# Patient Record
Sex: FEMALE | Race: WHITE | NOT HISPANIC OR LATINO | URBAN - METROPOLITAN AREA
[De-identification: names, ages, dates, MRNs, and addresses within clinical notes are randomized per-mention and may not be internally consistent; named-entity substitution may affect disease eponyms.]

---

## 2020-07-25 ENCOUNTER — TELEPHONE ENCOUNTER (OUTPATIENT)
Dept: URBAN - METROPOLITAN AREA CLINIC 13 | Facility: CLINIC | Age: 50
End: 2020-07-25

## 2020-07-25 RX ORDER — PREDNISONE 10 MG/1
TAKE 1 TABLET BY MOUTH THREE TIMES DAILY TABLET ORAL
Qty: 90 | Refills: 0 | OUTPATIENT
Start: 2017-09-06 | End: 2018-09-05

## 2020-07-25 RX ORDER — POLYETHYLENE GLYCOL 3350, SODIUM SULFATE, SODIUM CHLORIDE, POTASSIUM CHLORIDE, ASCORBIC ACID, SODIUM ASCORBATE 7.5-2.691G
TAKE 32 OZ AS DIRECTED DRINK ONE BTL STARTING @ 6P AND THE SECOND BTL 5 HR PRIOR TO PROCEDURE KIT ORAL
Qty: 1 | Refills: 0 | OUTPATIENT
Start: 2014-03-12 | End: 2014-11-12

## 2020-07-25 RX ORDER — POLYETHYLENE GLYCOL 3350, SODIUM SULFATE, SODIUM CHLORIDE, POTASSIUM CHLORIDE, ASCORBIC ACID, SODIUM ASCORBATE 7.5-2.691G
TAKE 32 OZ AS DIRECTED DRINK ONE BTL STARTING @ 6P AND THE SECOND BTL 5 HR PRIOR TO PROCEDURE KIT ORAL
Qty: 1 | Refills: 0 | OUTPATIENT
Start: 2014-03-12 | End: 2014-03-12

## 2020-07-25 RX ORDER — CALCIUM CITRATE/VITAMIN D3 315MG-6.25
TAKE 1 TABLET TWICE DAILY PT UNSURE OF DOSAGE TABLET ORAL
Refills: 0 | OUTPATIENT
Start: 2008-06-03 | End: 2014-11-12

## 2020-07-25 RX ORDER — BUDESONIDE 9 MG/1
TAKE 1 9 MG TABLET BY MOUTH EVERY MORNING TABLET, EXTENDED RELEASE ORAL
Qty: 30 | Refills: 1 | OUTPATIENT
Start: 2017-08-28 | End: 2017-10-19

## 2020-07-25 RX ORDER — IBANDRONATE SODIUM 150 MG
TAKE AS DIRECTED TABLET ORAL
Refills: 0 | OUTPATIENT
Start: 2008-06-03 | End: 2011-12-06

## 2020-07-25 RX ORDER — PREDNISONE 20 MG/1
TAKE 1 TABLET DAILY TABLET ORAL
Qty: 30 | Refills: 0 | OUTPATIENT
Start: 2012-05-18 | End: 2014-03-12

## 2020-07-25 RX ORDER — POLYETHYLENE GLYCOL 3350, SODIUM SULFATE, SODIUM CHLORIDE, POTASSIUM CHLORIDE, ASCORBIC ACID, SODIUM ASCORBATE 7.5-2.691G
DRINK HALF OF THE SOLUTION AT 5 PM THE DAY BEFORE PROCEDURE AND THEN LAST HALF 6 HOURS PRIOR TO PROCEDURE KIT ORAL
Qty: 1 | Refills: 0 | OUTPATIENT
Start: 2016-09-08 | End: 2016-11-18

## 2020-07-25 RX ORDER — BUDESONIDE 3 MG/1
TAKE 3 CAPSULE DAILY CAPSULE, COATED PELLETS ORAL
Qty: 90 | Refills: 11 | OUTPATIENT
Start: 2017-09-01 | End: 2017-10-19

## 2020-07-25 RX ORDER — LISINOPRIL 10 MG/1
TAKE 1 TABLET DAILY TABLET ORAL
Refills: 0 | OUTPATIENT
Start: 2013-07-16 | End: 2017-08-28

## 2020-07-26 ENCOUNTER — TELEPHONE ENCOUNTER (OUTPATIENT)
Dept: URBAN - METROPOLITAN AREA CLINIC 13 | Facility: CLINIC | Age: 50
End: 2020-07-26

## 2020-07-26 RX ORDER — HYDROCODONE BITARTRATE AND ACETAMINOPHEN 5; 325 MG/1; MG/1
TABLET ORAL
Qty: 15 | Refills: 0 | Status: ACTIVE | COMMUNITY
Start: 2019-05-14

## 2020-07-26 RX ORDER — MERCAPTOPURINE 50 MG/1
TAKE 1 AND 1/2 TABLETS BY MOUTH DAILY TABLET ORAL
Qty: 45 | Refills: 0 | Status: ACTIVE | COMMUNITY
Start: 2020-02-10

## 2020-07-26 RX ORDER — OSELTAMIVIR PHOSPHATE 75 MG/1
TAKE 1 CAPSULE BY MOUTH EVERY DAY CAPSULE ORAL
Qty: 10 | Refills: 0 | Status: ACTIVE | COMMUNITY
Start: 2018-01-12

## 2020-07-26 RX ORDER — CEPHALEXIN 500 MG/1
CAPSULE ORAL
Qty: 15 | Refills: 0 | Status: ACTIVE | COMMUNITY
Start: 2019-05-14

## 2020-07-26 RX ORDER — ALENDRONATE SODIUM 70 MG/1
TABLET ORAL
Qty: 4 | Refills: 0 | Status: ACTIVE | COMMUNITY
Start: 2013-06-04

## 2020-07-26 RX ORDER — BUTALBITAL, ACETAMINOPHEN, AND CAFFEINE 50; 325; 40 MG/1; MG/1; MG/1
TABLET ORAL
Qty: 40 | Refills: 0 | Status: ACTIVE | COMMUNITY
Start: 2016-07-05

## 2020-07-26 RX ORDER — LOSARTAN POTASSIUM 25 MG/1
TABLET, FILM COATED ORAL
Qty: 90 | Refills: 0 | Status: ACTIVE | COMMUNITY
Start: 2019-02-25

## 2020-07-26 RX ORDER — POLYETHYLENE GLYCOL 3350, SODIUM SULFATE, SODIUM CHLORIDE, POTASSIUM CHLORIDE, ASCORBIC ACID, SODIUM ASCORBATE 7.5-2.691G
KIT ORAL
Qty: 1 | Refills: 0 | Status: ACTIVE | COMMUNITY
Start: 2011-12-03

## 2020-07-26 RX ORDER — DAPSONE 75 MG/G
APPLY TOPICALLY A PEA SIZED AMOUNT TO FACE ONCE DAILY IN THE MORNING GEL TOPICAL
Qty: 60 | Refills: 0 | Status: ACTIVE | COMMUNITY
Start: 2019-10-10

## 2020-07-26 RX ORDER — NEBIVOLOL HYDROCHLORIDE 5 MG/1
TABLET ORAL
Qty: 7 | Refills: 0 | Status: ACTIVE | COMMUNITY
Start: 2013-05-22

## 2020-07-26 RX ORDER — CYCLOBENZAPRINE HYDROCHLORIDE 10 MG/1
TABLET, FILM COATED ORAL
Qty: 90 | Refills: 0 | Status: ACTIVE | COMMUNITY
Start: 2015-12-17

## 2020-07-26 RX ORDER — NORETHINDRONE ACETATE AND ETHINYL ESTRADIOL 1; 20 MG/1; UG/1
TABLET ORAL
Qty: 21 | Refills: 0 | Status: ACTIVE | COMMUNITY
Start: 2013-03-11

## 2020-07-26 RX ORDER — METOPROLOL SUCCINATE 50 MG/1
TABLET, EXTENDED RELEASE ORAL
Qty: 30 | Refills: 0 | Status: ACTIVE | COMMUNITY
Start: 2013-06-03

## 2020-07-26 RX ORDER — BUTALBITAL, ACETAMINOPHEN, AND CAFFEINE 50; 325; 40 MG/1; MG/1; MG/1
TABLET ORAL
Qty: 40 | Refills: 0 | Status: ACTIVE | COMMUNITY
Start: 2015-12-15

## 2020-07-26 RX ORDER — BUTALBITAL, ACETAMINOPHEN, AND CAFFEINE 50; 325; 40 MG/1; MG/1; MG/1
TABLET ORAL
Qty: 40 | Refills: 0 | Status: ACTIVE | COMMUNITY
Start: 2013-07-17

## 2020-07-26 RX ORDER — SPIRONOLACTONE 50 MG/1
TABLET, FILM COATED ORAL
Qty: 30 | Refills: 0 | Status: ACTIVE | COMMUNITY
Start: 2019-10-07

## 2020-07-26 RX ORDER — NORETHINDRONE ACETATE AND ETHINYL ESTRADIOL 1; 20 MG/1; UG/1
TABLET ORAL
Qty: 21 | Refills: 0 | Status: ACTIVE | COMMUNITY
Start: 2011-10-06

## 2020-07-26 RX ORDER — BUTALBITAL, ACETAMINOPHEN, AND CAFFEINE 50; 325; 40 MG/1; MG/1; MG/1
TABLET ORAL
Qty: 30 | Refills: 0 | Status: ACTIVE | COMMUNITY
Start: 2011-12-07

## 2020-07-26 RX ORDER — SIMVASTATIN 20 MG/1
TABLET, FILM COATED ORAL
Qty: 30 | Refills: 0 | Status: ACTIVE | COMMUNITY
Start: 2012-04-26

## 2020-07-26 RX ORDER — BUTALBITAL, ACETAMINOPHEN, AND CAFFEINE 50; 325; 40 MG/1; MG/1; MG/1
TABLET ORAL
Qty: 30 | Refills: 0 | Status: ACTIVE | COMMUNITY
Start: 2012-05-23

## 2020-07-26 RX ORDER — DIAZEPAM 5 MG/1
TABLET ORAL
Qty: 1 | Refills: 0 | Status: ACTIVE | COMMUNITY
Start: 2015-11-25

## 2020-07-26 RX ORDER — SIMVASTATIN 20 MG/1
TAKE 1 TABLET DAILY TABLET, FILM COATED ORAL
Refills: 0 | Status: ACTIVE | COMMUNITY
Start: 2012-09-19

## 2020-07-26 RX ORDER — LORAZEPAM 0.5 MG/1
TABLET ORAL
Qty: 25 | Refills: 0 | Status: ACTIVE | COMMUNITY
Start: 2011-12-07

## 2020-07-26 RX ORDER — TRAMADOL HYDROCHLORIDE 50 MG/1
TK 1 TO 2 TS PO Q 4 TO 6 H PRN P TABLET ORAL
Qty: 32 | Refills: 0 | Status: ACTIVE | COMMUNITY
Start: 2017-12-29

## 2020-07-26 RX ORDER — NORETHINDRONE ACETATE/ETHINYL ESTRADIOL 1.5-0.03MG
KIT ORAL
Qty: 21 | Refills: 0 | Status: ACTIVE | COMMUNITY
Start: 2017-07-19

## 2020-07-26 RX ORDER — NORETHINDRONE ACETATE/ETHINYL ESTRADIOL 1.5-0.03MG
KIT ORAL
Qty: 63 | Refills: 0 | Status: ACTIVE | COMMUNITY
Start: 2016-01-28

## 2020-07-26 RX ORDER — BUTALBITAL, ACETAMINOPHEN, AND CAFFEINE 50; 325; 40 MG/1; MG/1; MG/1
TK 1 T PO  Q 6 H PRN P TABLET ORAL
Qty: 40 | Refills: 0 | Status: ACTIVE | COMMUNITY
Start: 2018-06-26

## 2020-07-26 RX ORDER — CARVEDILOL 6.25 MG/1
TAKE 1 TABLET TWICE DAILY,  WITH MORNING AND EVENING MEAL TABLET, FILM COATED ORAL
Refills: 0 | Status: ACTIVE | COMMUNITY

## 2020-07-26 RX ORDER — BUTALBITAL, ACETAMINOPHEN, AND CAFFEINE 50; 325; 40 MG/1; MG/1; MG/1
TK ONE T PO  Q 6 H PRN P TABLET ORAL
Qty: 40 | Refills: 0 | Status: ACTIVE | COMMUNITY
Start: 2018-01-18

## 2020-07-26 RX ORDER — NORETHINDRONE ACETATE AND ETHINYL ESTRADIOL 1; 20 MG/1; UG/1
TABLET ORAL
Qty: 21 | Refills: 0 | Status: ACTIVE | COMMUNITY
Start: 2012-02-23

## 2020-07-26 RX ORDER — NORETHINDRONE ACETATE/ETHINYL ESTRADIOL 1.5-0.03MG
KIT ORAL
Qty: 63 | Refills: 0 | Status: ACTIVE | COMMUNITY
Start: 2016-05-26

## 2020-07-26 RX ORDER — BUTALBITAL, ACETAMINOPHEN, AND CAFFEINE 50; 325; 40 MG/1; MG/1; MG/1
TABLET ORAL
Qty: 40 | Refills: 0 | Status: ACTIVE | COMMUNITY
Start: 2019-05-29

## 2020-07-26 RX ORDER — DICYCLOMINE HYDROCHLORIDE 10 MG/1
TAKE 1 CAPSULE EVERY 6 HOURS PRN ABDOMINAL PAIN CAPSULE ORAL
Qty: 60 | Refills: 3 | Status: ACTIVE | COMMUNITY
Start: 2016-11-17

## 2020-07-26 RX ORDER — BUTALBITAL, ACETAMINOPHEN, AND CAFFEINE 50; 325; 40 MG/1; MG/1; MG/1
TABLET ORAL
Qty: 40 | Refills: 0 | Status: ACTIVE | COMMUNITY
Start: 2014-06-19

## 2020-07-26 RX ORDER — BUTALBITAL, ACETAMINOPHEN, AND CAFFEINE 50; 325; 40 MG/1; MG/1; MG/1
TABLET ORAL
Qty: 40 | Refills: 0 | Status: ACTIVE | COMMUNITY
Start: 2019-03-15

## 2020-07-26 RX ORDER — TRETIONIN 0.5 MG/G
APP A PEA SIZED AMOUNT TO WHOLE FACE NIGHTLY CREAM TOPICAL
Qty: 45 | Refills: 0 | Status: ACTIVE | COMMUNITY
Start: 2018-03-22

## 2020-07-26 RX ORDER — CALCITONIN SALMON 200 [IU]/1
INSERT 1 SQUIRT IN ONE NOSTRIL EVERY DAY, ALTERNATING EACH NOSTRIL SPRAY, METERED NASAL
Refills: 0 | Status: ACTIVE | COMMUNITY

## 2020-08-07 ENCOUNTER — ERX REFILL RESPONSE (OUTPATIENT)
Dept: URBAN - METROPOLITAN AREA CLINIC 13 | Facility: CLINIC | Age: 50
End: 2020-08-07

## 2020-08-07 RX ORDER — MERCAPTOPURINE 50 MG/1
TAKE 1 AND 1/2 TABLETS BY MOUTH DAILY TABLET ORAL
Qty: 45 | Refills: 0

## 2020-09-08 ENCOUNTER — ERX REFILL RESPONSE (OUTPATIENT)
Dept: URBAN - METROPOLITAN AREA CLINIC 13 | Facility: CLINIC | Age: 50
End: 2020-09-08

## 2020-09-08 RX ORDER — MERCAPTOPURINE 50 MG/1
TAKE 1 AND 1/2 TABLETS BY MOUTH DAILY TABLET ORAL
Qty: 45 | Refills: 0

## 2020-12-11 ENCOUNTER — TELEPHONE ENCOUNTER (OUTPATIENT)
Dept: URBAN - METROPOLITAN AREA CLINIC 113 | Facility: CLINIC | Age: 50
End: 2020-12-11

## 2020-12-11 RX ORDER — MERCAPTOPURINE 50 MG/1
TAKE 1 AND 1/2 TABLETS BY MOUTH DAILY TABLET ORAL ONCE A DAY
Qty: 90 | Refills: 2

## 2021-07-20 ENCOUNTER — TELEPHONE ENCOUNTER (OUTPATIENT)
Dept: URBAN - METROPOLITAN AREA CLINIC 113 | Facility: CLINIC | Age: 51
End: 2021-07-20

## 2021-07-20 RX ORDER — DICYCLOMINE HYDROCHLORIDE 10 MG/1
TAKE 1 CAPSULE EVERY 6 HOURS PRN ABDOMINAL PAIN CAPSULE ORAL
Qty: 120 | Refills: 0
Start: 2016-11-17 | End: 2021-08-19

## 2021-09-20 ENCOUNTER — ERX REFILL RESPONSE (OUTPATIENT)
Dept: URBAN - METROPOLITAN AREA CLINIC 107 | Facility: CLINIC | Age: 51
End: 2021-09-20

## 2021-09-20 RX ORDER — MERCAPTOPURINE 50 MG/1
TAKE 1 AND 1/2 TABLETS BY MOUTH EVERY DAY TABLET ORAL
Qty: 90 TABLET | Refills: 1 | OUTPATIENT

## 2021-09-20 RX ORDER — MERCAPTOPURINE 50 MG/1
TAKE 1 AND 1/2 TABLETS BY MOUTH DAILY TABLET ORAL ONCE A DAY
Qty: 90 | Refills: 2 | OUTPATIENT

## 2021-09-23 ENCOUNTER — WEB ENCOUNTER (OUTPATIENT)
Dept: URBAN - METROPOLITAN AREA CLINIC 113 | Facility: CLINIC | Age: 51
End: 2021-09-23

## 2021-09-23 ENCOUNTER — TELEPHONE ENCOUNTER (OUTPATIENT)
Dept: URBAN - METROPOLITAN AREA CLINIC 113 | Facility: CLINIC | Age: 51
End: 2021-09-23

## 2021-09-23 ENCOUNTER — LAB OUTSIDE AN ENCOUNTER (OUTPATIENT)
Dept: URBAN - METROPOLITAN AREA CLINIC 113 | Facility: CLINIC | Age: 51
End: 2021-09-23

## 2021-09-23 ENCOUNTER — OFFICE VISIT (OUTPATIENT)
Dept: URBAN - METROPOLITAN AREA CLINIC 113 | Facility: CLINIC | Age: 51
End: 2021-09-23
Payer: COMMERCIAL

## 2021-09-23 VITALS
HEIGHT: 61 IN | WEIGHT: 111 LBS | SYSTOLIC BLOOD PRESSURE: 105 MMHG | DIASTOLIC BLOOD PRESSURE: 67 MMHG | TEMPERATURE: 98.4 F | BODY MASS INDEX: 20.96 KG/M2 | HEART RATE: 63 BPM

## 2021-09-23 DIAGNOSIS — D64.9 ANEMIA, UNSPECIFIED TYPE: ICD-10-CM

## 2021-09-23 DIAGNOSIS — K51.90 ULCERATIVE COLITIS IN REMISSION: ICD-10-CM

## 2021-09-23 DIAGNOSIS — R10.84 GENERALIZED ABDOMINAL PAIN: ICD-10-CM

## 2021-09-23 PROCEDURE — 99213 OFFICE O/P EST LOW 20 MIN: CPT | Performed by: INTERNAL MEDICINE

## 2021-09-23 RX ORDER — NORETHINDRONE ACETATE AND ETHINYL ESTRADIOL 1; 20 MG/1; UG/1
TABLET ORAL
Qty: 21 | Refills: 0 | Status: ON HOLD | COMMUNITY
Start: 2013-03-11

## 2021-09-23 RX ORDER — LOSARTAN POTASSIUM 25 MG/1
TABLET, FILM COATED ORAL
Qty: 90 | Refills: 0 | Status: ON HOLD | COMMUNITY
Start: 2019-02-25

## 2021-09-23 RX ORDER — NORETHINDRONE ACETATE/ETHINYL ESTRADIOL 1.5-0.03MG
KIT ORAL
Qty: 63 | Refills: 0 | Status: ON HOLD | COMMUNITY
Start: 2016-01-28

## 2021-09-23 RX ORDER — BUTALBITAL, ACETAMINOPHEN, AND CAFFEINE 50; 325; 40 MG/1; MG/1; MG/1
TABLET ORAL
Qty: 30 | Refills: 0 | Status: ON HOLD | COMMUNITY
Start: 2011-12-07

## 2021-09-23 RX ORDER — CYCLOBENZAPRINE HYDROCHLORIDE 10 MG/1
TABLET, FILM COATED ORAL
Qty: 90 | Refills: 0 | Status: ON HOLD | COMMUNITY
Start: 2015-12-17

## 2021-09-23 RX ORDER — NORETHINDRONE ACETATE AND ETHINYL ESTRADIOL 1; 20 MG/1; UG/1
TABLET ORAL
Qty: 21 | Refills: 0 | Status: ON HOLD | COMMUNITY
Start: 2011-10-06

## 2021-09-23 RX ORDER — POLYETHYLENE GLYCOL 3350, SODIUM SULFATE, SODIUM CHLORIDE, POTASSIUM CHLORIDE, ASCORBIC ACID, SODIUM ASCORBATE 7.5-2.691G
KIT ORAL
Qty: 1 | Refills: 0 | Status: ON HOLD | COMMUNITY
Start: 2011-12-03

## 2021-09-23 RX ORDER — NEBIVOLOL HYDROCHLORIDE 5 MG/1
TABLET ORAL
Qty: 7 | Refills: 0 | Status: ON HOLD | COMMUNITY
Start: 2013-05-22

## 2021-09-23 RX ORDER — CEPHALEXIN 500 MG/1
CAPSULE ORAL
Qty: 15 | Refills: 0 | Status: ON HOLD | COMMUNITY
Start: 2019-05-14

## 2021-09-23 RX ORDER — CALCITONIN SALMON 200 [IU]/1
_INSERT 1 SQUIRT IN ONE NOSTRIL EVERY DAY, ALTERNATING EACH NOSTRIL SPRAY, METERED NASAL
Refills: 0 | Status: ACTIVE | COMMUNITY

## 2021-09-23 RX ORDER — LORAZEPAM 0.5 MG/1
TABLET ORAL
Qty: 25 | Refills: 0 | Status: ON HOLD | COMMUNITY
Start: 2011-12-07

## 2021-09-23 RX ORDER — ZOLPIDEM TARTRATE 10 MG/1
1 TABLET AT BEDTIME AS NEEDED TABLET, FILM COATED ORAL ONCE A DAY
Status: ACTIVE | COMMUNITY

## 2021-09-23 RX ORDER — TRETIONIN 0.5 MG/G
APP A PEA SIZED AMOUNT TO WHOLE FACE NIGHTLY CREAM TOPICAL
Qty: 45 | Refills: 0 | Status: ON HOLD | COMMUNITY
Start: 2018-03-22

## 2021-09-23 RX ORDER — METOPROLOL SUCCINATE 50 MG/1
TABLET, EXTENDED RELEASE ORAL
Qty: 30 | Refills: 0 | Status: ON HOLD | COMMUNITY
Start: 2013-06-03

## 2021-09-23 RX ORDER — DAPSONE 75 MG/G
APPLY TOPICALLY A PEA SIZED AMOUNT TO FACE ONCE DAILY IN THE MORNING GEL TOPICAL
Qty: 60 | Refills: 0 | Status: ON HOLD | COMMUNITY
Start: 2019-10-10

## 2021-09-23 RX ORDER — MERCAPTOPURINE 50 MG/1
TAKE 1 AND 1/2 TABLETS BY MOUTH EVERY DAY TABLET ORAL
Qty: 90 TABLET | Refills: 1 | Status: ACTIVE | COMMUNITY

## 2021-09-23 RX ORDER — HYDROCODONE BITARTRATE AND ACETAMINOPHEN 5; 325 MG/1; MG/1
TABLET ORAL
Qty: 15 | Refills: 0 | Status: ON HOLD | COMMUNITY
Start: 2019-05-14

## 2021-09-23 RX ORDER — ALENDRONATE SODIUM 70 MG/1
TABLET ORAL
Qty: 4 | Refills: 0 | Status: ON HOLD | COMMUNITY
Start: 2013-06-04

## 2021-09-23 RX ORDER — SPIRONOLACTONE 50 MG/1
TABLET, FILM COATED ORAL
Qty: 30 | Refills: 0 | Status: ACTIVE | COMMUNITY
Start: 2019-10-07

## 2021-09-23 RX ORDER — CARVEDILOL 6.25 MG/1
TAKE 1 TABLET TWICE DAILY,  WITH MORNING AND EVENING MEAL TABLET, FILM COATED ORAL
Refills: 0 | Status: ACTIVE | COMMUNITY

## 2021-09-23 RX ORDER — SIMVASTATIN 20 MG/1
TABLET, FILM COATED ORAL
Qty: 30 | Refills: 0 | Status: ACTIVE | COMMUNITY
Start: 2012-04-26

## 2021-09-23 RX ORDER — POLYETHYLENE GLYCOL 3350, SODIUM CHLORIDE, SODIUM BICARBONATE, POTASSIUM CHLORIDE 420; 11.2; 5.72; 1.48 G/4L; G/4L; G/4L; G/4L
AS DIRECTED POWDER, FOR SOLUTION ORAL ONCE
Qty: 420 GM | Refills: 0 | OUTPATIENT
Start: 2021-09-23 | End: 2021-09-24

## 2021-09-23 RX ORDER — OSELTAMIVIR PHOSPHATE 75 MG/1
TAKE 1 CAPSULE BY MOUTH EVERY DAY CAPSULE ORAL
Qty: 10 | Refills: 0 | Status: ON HOLD | COMMUNITY
Start: 2018-01-12

## 2021-09-23 RX ORDER — TRAMADOL HYDROCHLORIDE 50 MG/1
TK 1 TO 2 TS PO Q 4 TO 6 H PRN P TABLET ORAL
Qty: 32 | Refills: 0 | Status: ON HOLD | COMMUNITY
Start: 2017-12-29

## 2021-09-23 RX ORDER — DIAZEPAM 5 MG/1
TABLET ORAL
Qty: 1 | Refills: 0 | Status: ON HOLD | COMMUNITY
Start: 2015-11-25

## 2021-09-23 NOTE — HPI-TODAY'S VISIT:
Ms. Monreo is a 50-year-old female with a history of panulcerative colitis diagnosed in 1991 and sororal history of colon cancer (sister at age 33), presenting for follow up.  She was last seen here on June 17, 2019 after colonoscopy.  She had low thiopurine metabolites on 4/24/19.  Colonoscopy was performed on 4/18/19. BBPS 9. The entire examined colon was normal, status post unremarkable random biopsies.  At the time of her last visit, she continued to do well on 6-MP. She was having 3 nonbloody stools per day, with infrequent exacerbations of lower abdominal cramping, which respond to dicyclomine when needed. No nausea or vomiting. She states she self-discontinued 6-MP at the time of her disease flare last year.  At the time of her last visit, 6-MP was continued at 75 mg per day.  Surveillance colonoscopy was due in April 2022.   She returns today still on 6-MP at 75 mg per day.  She's had no significant flares of her colitis.  She denies rectal bleeding or diarrhea.  She is having 3-4 Formed bowel movements per day.  She is having intermittent abdominal pain was described as a pressure, more in the upper abdomen than lower abdomen.  She also describes a recent drop in hemoglobin from 12 to 10.8.  She occasionally will use nonsteroidal anti-inflammatory drugs.  She had stool studies done recently which showed her to be Hemoccult negative.

## 2021-09-24 LAB
BASO (ABSOLUTE): 0.1
BASOS: 1
EOS (ABSOLUTE): 0.1
EOS: 1
FERRITIN, SERUM: 92
FOLATE (FOLIC ACID), SERUM: 7.9
HEMATOCRIT: 35.1
HEMATOLOGY COMMENTS:: (no result)
HEMOGLOBIN: 12.1
IMMATURE CELLS: (no result)
IMMATURE GRANS (ABS): 0
IMMATURE GRANULOCYTES: 0
IRON BIND.CAP.(TIBC): 298
IRON SATURATION: 19
IRON: 58
LYMPHS (ABSOLUTE): 1.3
LYMPHS: 25
MCH: 33.5
MCHC: 34.5
MCV: 97
MONOCYTES(ABSOLUTE): 0.6
MONOCYTES: 11
NEUTROPHILS (ABSOLUTE): 3.3
NEUTROPHILS: 62
NRBC: (no result)
PLATELETS: 253
RBC: 3.61
RDW: 12.2
UIBC: 240
VITAMIN B12: 635
WBC: 5.3

## 2021-11-10 ENCOUNTER — CLAIMS CREATED FROM THE CLAIM WINDOW (OUTPATIENT)
Dept: URBAN - METROPOLITAN AREA CLINIC 4 | Facility: CLINIC | Age: 51
End: 2021-11-10
Payer: COMMERCIAL

## 2021-11-10 ENCOUNTER — OFFICE VISIT (OUTPATIENT)
Dept: URBAN - METROPOLITAN AREA SURGERY CENTER 25 | Facility: SURGERY CENTER | Age: 51
End: 2021-11-10
Payer: COMMERCIAL

## 2021-11-10 DIAGNOSIS — K52.89 OTHER AND UNSPECIFIED NONINFECTIOUS GASTROENTERITIS AND COLITIS: ICD-10-CM

## 2021-11-10 DIAGNOSIS — K52.89 MICROSCOPIC COLITIS [LYMPHOCYTIC, DIAGNOSED 12/2017; COULD NOT TOLERATE APRISO]: ICD-10-CM

## 2021-11-10 DIAGNOSIS — K51.00 CHRONIC ULCERATIVE PANCOLITIS: ICD-10-CM

## 2021-11-10 DIAGNOSIS — K63.89 POLYP, HYPERPLASTIC: ICD-10-CM

## 2021-11-10 PROCEDURE — G8907 PT DOC NO EVENTS ON DISCHARG: HCPCS | Performed by: INTERNAL MEDICINE

## 2021-11-10 PROCEDURE — 88305 TISSUE EXAM BY PATHOLOGIST: CPT | Performed by: PATHOLOGY

## 2021-11-10 PROCEDURE — 45380 COLONOSCOPY AND BIOPSY: CPT | Performed by: INTERNAL MEDICINE

## 2021-11-10 RX ORDER — TRETIONIN 0.5 MG/G
APP A PEA SIZED AMOUNT TO WHOLE FACE NIGHTLY CREAM TOPICAL
Qty: 45 | Refills: 0 | Status: ON HOLD | COMMUNITY
Start: 2018-03-22

## 2021-11-10 RX ORDER — SPIRONOLACTONE 50 MG/1
TABLET, FILM COATED ORAL
Qty: 30 | Refills: 0 | Status: ACTIVE | COMMUNITY
Start: 2019-10-07

## 2021-11-10 RX ORDER — NORETHINDRONE ACETATE AND ETHINYL ESTRADIOL 1; 20 MG/1; UG/1
TABLET ORAL
Qty: 21 | Refills: 0 | Status: ON HOLD | COMMUNITY
Start: 2013-03-11

## 2021-11-10 RX ORDER — ZOLPIDEM TARTRATE 10 MG/1
1 TABLET AT BEDTIME AS NEEDED TABLET, FILM COATED ORAL ONCE A DAY
Status: ACTIVE | COMMUNITY

## 2021-11-10 RX ORDER — MERCAPTOPURINE 50 MG/1
TAKE 1 AND 1/2 TABLETS BY MOUTH EVERY DAY TABLET ORAL
Qty: 90 TABLET | Refills: 1 | Status: ACTIVE | COMMUNITY

## 2021-11-10 RX ORDER — POLYETHYLENE GLYCOL 3350, SODIUM SULFATE, SODIUM CHLORIDE, POTASSIUM CHLORIDE, ASCORBIC ACID, SODIUM ASCORBATE 7.5-2.691G
KIT ORAL
Qty: 1 | Refills: 0 | Status: ON HOLD | COMMUNITY
Start: 2011-12-03

## 2021-11-10 RX ORDER — SIMVASTATIN 20 MG/1
TABLET, FILM COATED ORAL
Qty: 30 | Refills: 0 | Status: ACTIVE | COMMUNITY
Start: 2012-04-26

## 2021-11-10 RX ORDER — CALCITONIN SALMON 200 [IU]/1
_INSERT 1 SQUIRT IN ONE NOSTRIL EVERY DAY, ALTERNATING EACH NOSTRIL SPRAY, METERED NASAL
Refills: 0 | Status: ACTIVE | COMMUNITY

## 2021-11-10 RX ORDER — LOSARTAN POTASSIUM 25 MG/1
TABLET, FILM COATED ORAL
Qty: 90 | Refills: 0 | Status: ON HOLD | COMMUNITY
Start: 2019-02-25

## 2021-11-10 RX ORDER — CEPHALEXIN 500 MG/1
CAPSULE ORAL
Qty: 15 | Refills: 0 | Status: ON HOLD | COMMUNITY
Start: 2019-05-14

## 2021-11-10 RX ORDER — HYDROCODONE BITARTRATE AND ACETAMINOPHEN 5; 325 MG/1; MG/1
TABLET ORAL
Qty: 15 | Refills: 0 | Status: ON HOLD | COMMUNITY
Start: 2019-05-14

## 2021-11-10 RX ORDER — CYCLOBENZAPRINE HYDROCHLORIDE 10 MG/1
TABLET, FILM COATED ORAL
Qty: 90 | Refills: 0 | Status: ON HOLD | COMMUNITY
Start: 2015-12-17

## 2021-11-10 RX ORDER — TRAMADOL HYDROCHLORIDE 50 MG/1
TK 1 TO 2 TS PO Q 4 TO 6 H PRN P TABLET ORAL
Qty: 32 | Refills: 0 | Status: ON HOLD | COMMUNITY
Start: 2017-12-29

## 2021-11-10 RX ORDER — BUTALBITAL, ACETAMINOPHEN, AND CAFFEINE 50; 325; 40 MG/1; MG/1; MG/1
TABLET ORAL
Qty: 30 | Refills: 0 | Status: ON HOLD | COMMUNITY
Start: 2011-12-07

## 2021-11-10 RX ORDER — LORAZEPAM 0.5 MG/1
TABLET ORAL
Qty: 25 | Refills: 0 | Status: ON HOLD | COMMUNITY
Start: 2011-12-07

## 2021-11-10 RX ORDER — NORETHINDRONE ACETATE/ETHINYL ESTRADIOL 1.5-0.03MG
KIT ORAL
Qty: 63 | Refills: 0 | Status: ON HOLD | COMMUNITY
Start: 2016-01-28

## 2021-11-10 RX ORDER — CARVEDILOL 6.25 MG/1
TAKE 1 TABLET TWICE DAILY,  WITH MORNING AND EVENING MEAL TABLET, FILM COATED ORAL
Refills: 0 | Status: ACTIVE | COMMUNITY

## 2021-11-10 RX ORDER — METOPROLOL SUCCINATE 50 MG/1
TABLET, EXTENDED RELEASE ORAL
Qty: 30 | Refills: 0 | Status: ON HOLD | COMMUNITY
Start: 2013-06-03

## 2021-11-10 RX ORDER — DIAZEPAM 5 MG/1
TABLET ORAL
Qty: 1 | Refills: 0 | Status: ON HOLD | COMMUNITY
Start: 2015-11-25

## 2021-11-10 RX ORDER — ALENDRONATE SODIUM 70 MG/1
TABLET ORAL
Qty: 4 | Refills: 0 | Status: ON HOLD | COMMUNITY
Start: 2013-06-04

## 2021-11-10 RX ORDER — NORETHINDRONE ACETATE AND ETHINYL ESTRADIOL 1; 20 MG/1; UG/1
TABLET ORAL
Qty: 21 | Refills: 0 | Status: ON HOLD | COMMUNITY
Start: 2011-10-06

## 2021-11-10 RX ORDER — OSELTAMIVIR PHOSPHATE 75 MG/1
TAKE 1 CAPSULE BY MOUTH EVERY DAY CAPSULE ORAL
Qty: 10 | Refills: 0 | Status: ON HOLD | COMMUNITY
Start: 2018-01-12

## 2021-11-10 RX ORDER — NEBIVOLOL HYDROCHLORIDE 5 MG/1
TABLET ORAL
Qty: 7 | Refills: 0 | Status: ON HOLD | COMMUNITY
Start: 2013-05-22

## 2021-11-10 RX ORDER — DAPSONE 75 MG/G
APPLY TOPICALLY A PEA SIZED AMOUNT TO FACE ONCE DAILY IN THE MORNING GEL TOPICAL
Qty: 60 | Refills: 0 | Status: ON HOLD | COMMUNITY
Start: 2019-10-10

## 2022-01-20 ENCOUNTER — OFFICE VISIT (OUTPATIENT)
Dept: URBAN - METROPOLITAN AREA CLINIC 113 | Facility: CLINIC | Age: 52
End: 2022-01-20

## 2022-01-30 ENCOUNTER — ERX REFILL RESPONSE (OUTPATIENT)
Dept: URBAN - METROPOLITAN AREA CLINIC 107 | Facility: CLINIC | Age: 52
End: 2022-01-30

## 2022-01-30 RX ORDER — MERCAPTOPURINE 50 MG/1
TAKE 1 AND 1/2 TABLETS BY MOUTH EVERY DAY TABLET ORAL
Qty: 90 TABLET | Refills: 1 | OUTPATIENT

## 2022-05-24 ENCOUNTER — ERX REFILL RESPONSE (OUTPATIENT)
Dept: URBAN - METROPOLITAN AREA CLINIC 107 | Facility: CLINIC | Age: 52
End: 2022-05-24

## 2022-05-24 RX ORDER — MERCAPTOPURINE 50 MG/1
TAKE 1 AND 1/2 TABLETS BY MOUTH EVERY DAY TABLET ORAL
Qty: 90 TABLET | Refills: 1 | OUTPATIENT

## 2022-05-25 ENCOUNTER — ERX REFILL RESPONSE (OUTPATIENT)
Dept: URBAN - METROPOLITAN AREA CLINIC 107 | Facility: CLINIC | Age: 52
End: 2022-05-25

## 2022-05-25 RX ORDER — MERCAPTOPURINE 50 MG/1
TAKE 1 AND 1/2 TABLETS BY MOUTH EVERY DAY TABLET ORAL
Qty: 135 TABLET | Refills: 1 | OUTPATIENT

## 2022-05-25 RX ORDER — MERCAPTOPURINE 50 MG/1
TAKE 1 AND 1/2 TABLETS BY MOUTH EVERY DAY TABLET ORAL
Qty: 90 TABLET | Refills: 1 | OUTPATIENT

## 2022-07-06 ENCOUNTER — ERX REFILL RESPONSE (OUTPATIENT)
Dept: URBAN - METROPOLITAN AREA CLINIC 107 | Facility: CLINIC | Age: 52
End: 2022-07-06

## 2022-07-06 RX ORDER — MERCAPTOPURINE 50 MG/1
TAKE 1 AND 1/2 TABLETS BY MOUTH EVERY DAY TABLET ORAL
Qty: 135 TABLET | Refills: 0 | OUTPATIENT

## 2023-02-20 ENCOUNTER — OFFICE VISIT (OUTPATIENT)
Dept: URBAN - METROPOLITAN AREA CLINIC 113 | Facility: CLINIC | Age: 53
End: 2023-02-20

## 2023-03-31 ENCOUNTER — OFFICE VISIT (OUTPATIENT)
Dept: URBAN - METROPOLITAN AREA CLINIC 113 | Facility: CLINIC | Age: 53
End: 2023-03-31

## 2023-04-26 ENCOUNTER — OFFICE VISIT (OUTPATIENT)
Dept: URBAN - METROPOLITAN AREA CLINIC 113 | Facility: CLINIC | Age: 53
End: 2023-04-26

## 2023-05-19 ENCOUNTER — ERX REFILL RESPONSE (OUTPATIENT)
Dept: URBAN - METROPOLITAN AREA CLINIC 107 | Facility: CLINIC | Age: 53
End: 2023-05-19

## 2023-05-19 RX ORDER — MERCAPTOPURINE 50 MG/1
TAKE 1 AND 1/2 TABLETS BY MOUTH EVERY DAY TABLET ORAL
Qty: 135 TABLET | Refills: 0 | OUTPATIENT

## 2023-06-15 ENCOUNTER — OFFICE VISIT (OUTPATIENT)
Dept: URBAN - METROPOLITAN AREA CLINIC 113 | Facility: CLINIC | Age: 53
End: 2023-06-15
Payer: COMMERCIAL

## 2023-06-15 ENCOUNTER — DASHBOARD ENCOUNTERS (OUTPATIENT)
Age: 53
End: 2023-06-15

## 2023-06-15 VITALS
RESPIRATION RATE: 18 BRPM | HEART RATE: 58 BPM | TEMPERATURE: 97.2 F | WEIGHT: 115.2 LBS | SYSTOLIC BLOOD PRESSURE: 101 MMHG | DIASTOLIC BLOOD PRESSURE: 68 MMHG | BODY MASS INDEX: 21.75 KG/M2 | HEIGHT: 61 IN

## 2023-06-15 DIAGNOSIS — K51.00 ULCERATIVE PANCOLITIS WITHOUT COMPLICATION: ICD-10-CM

## 2023-06-15 DIAGNOSIS — Z79.899 HIGH RISK MEDICATION USE: ICD-10-CM

## 2023-06-15 PROBLEM — 444548001: Status: ACTIVE | Noted: 2023-06-15

## 2023-06-15 PROBLEM — 453861000124107: Status: ACTIVE | Noted: 2023-06-15

## 2023-06-15 PROCEDURE — 99214 OFFICE O/P EST MOD 30 MIN: CPT | Performed by: NURSE PRACTITIONER

## 2023-06-15 RX ORDER — METOPROLOL SUCCINATE 50 MG/1
TABLET, EXTENDED RELEASE ORAL
Qty: 30 | Refills: 0 | Status: ON HOLD | COMMUNITY
Start: 2013-06-03

## 2023-06-15 RX ORDER — NORETHINDRONE ACETATE AND ETHINYL ESTRADIOL 1; 20 MG/1; UG/1
TABLET ORAL
Qty: 21 | Refills: 0 | Status: ON HOLD | COMMUNITY
Start: 2011-10-06

## 2023-06-15 RX ORDER — ALENDRONATE SODIUM 70 MG/1
TABLET ORAL
Qty: 4 | Refills: 0 | Status: ON HOLD | COMMUNITY
Start: 2013-06-04

## 2023-06-15 RX ORDER — HYDROCODONE BITARTRATE AND ACETAMINOPHEN 5; 325 MG/1; MG/1
TABLET ORAL
Qty: 15 | Refills: 0 | Status: ON HOLD | COMMUNITY
Start: 2019-05-14

## 2023-06-15 RX ORDER — ZOLPIDEM TARTRATE 10 MG/1
TAKE 0.5 TABLET BEDTIME PRN TABLET, FILM COATED ORAL ONCE A DAY
Status: ACTIVE | COMMUNITY

## 2023-06-15 RX ORDER — MERCAPTOPURINE 50 MG/1
TAKE 1 AND 1/2 TABLETS BY MOUTH EVERY DAY TABLET ORAL
Qty: 150 | Refills: 3

## 2023-06-15 RX ORDER — NEBIVOLOL HYDROCHLORIDE 5 MG/1
TABLET ORAL
Qty: 7 | Refills: 0 | Status: ON HOLD | COMMUNITY
Start: 2013-05-22

## 2023-06-15 RX ORDER — SPIRONOLACTONE 50 MG/1
TABLET, FILM COATED ORAL
Qty: 30 | Refills: 0 | Status: ACTIVE | COMMUNITY
Start: 2019-10-07

## 2023-06-15 RX ORDER — NORETHINDRONE ACETATE/ETHINYL ESTRADIOL 1.5-0.03MG
KIT ORAL
Qty: 63 | Refills: 0 | Status: ON HOLD | COMMUNITY
Start: 2016-01-28

## 2023-06-15 RX ORDER — SIMVASTATIN 20 MG/1
TABLET, FILM COATED ORAL
Qty: 30 | Refills: 0 | Status: ACTIVE | COMMUNITY
Start: 2012-04-26

## 2023-06-15 RX ORDER — POLYETHYLENE GLYCOL 3350, SODIUM SULFATE, SODIUM CHLORIDE, POTASSIUM CHLORIDE, ASCORBIC ACID, SODIUM ASCORBATE 7.5-2.691G
KIT ORAL
Qty: 1 | Refills: 0 | Status: ON HOLD | COMMUNITY
Start: 2011-12-03

## 2023-06-15 RX ORDER — DICYCLOMINE HYDROCHLORIDE 10 MG/1
1 TABLET CAPSULE ORAL
Qty: 60 | Refills: 3 | OUTPATIENT
Start: 2023-06-15 | End: 2023-10-13

## 2023-06-15 RX ORDER — DAPSONE 75 MG/G
APPLY TOPICALLY A PEA SIZED AMOUNT TO FACE ONCE DAILY IN THE MORNING GEL TOPICAL
Qty: 60 | Refills: 0 | Status: ON HOLD | COMMUNITY
Start: 2019-10-10

## 2023-06-15 RX ORDER — LORAZEPAM 0.5 MG/1
TABLET ORAL
Qty: 25 | Refills: 0 | Status: ON HOLD | COMMUNITY
Start: 2011-12-07

## 2023-06-15 RX ORDER — TRAMADOL HYDROCHLORIDE 50 MG/1
TK 1 TO 2 TS PO Q 4 TO 6 H PRN P TABLET ORAL
Qty: 32 | Refills: 0 | Status: ON HOLD | COMMUNITY
Start: 2017-12-29

## 2023-06-15 RX ORDER — TRETIONIN 0.5 MG/G
APP A PEA SIZED AMOUNT TO WHOLE FACE NIGHTLY CREAM TOPICAL
Qty: 45 | Refills: 0 | Status: ON HOLD | COMMUNITY
Start: 2018-03-22

## 2023-06-15 RX ORDER — BUTALBITAL, ACETAMINOPHEN, AND CAFFEINE 50; 325; 40 MG/1; MG/1; MG/1
TABLET ORAL
Qty: 30 | Refills: 0 | Status: ON HOLD | COMMUNITY
Start: 2011-12-07

## 2023-06-15 RX ORDER — LOSARTAN POTASSIUM 25 MG/1
TABLET, FILM COATED ORAL
Qty: 90 | Refills: 0 | Status: ON HOLD | COMMUNITY
Start: 2019-02-25

## 2023-06-15 RX ORDER — CEPHALEXIN 500 MG/1
CAPSULE ORAL
Qty: 15 | Refills: 0 | Status: ON HOLD | COMMUNITY
Start: 2019-05-14

## 2023-06-15 RX ORDER — OSELTAMIVIR PHOSPHATE 75 MG/1
TAKE 1 CAPSULE BY MOUTH EVERY DAY CAPSULE ORAL
Qty: 10 | Refills: 0 | Status: ON HOLD | COMMUNITY
Start: 2018-01-12

## 2023-06-15 RX ORDER — MERCAPTOPURINE 50 MG/1
TAKE 1 AND 1/2 TABLETS BY MOUTH EVERY DAY TABLET ORAL
Qty: 135 TABLET | Refills: 0 | Status: ACTIVE | COMMUNITY

## 2023-06-15 RX ORDER — DIAZEPAM 5 MG/1
TABLET ORAL
Qty: 1 | Refills: 0 | Status: ON HOLD | COMMUNITY
Start: 2015-11-25

## 2023-06-15 RX ORDER — CARVEDILOL 6.25 MG/1
TAKE 1 TABLET TWICE DAILY,  WITH MORNING AND EVENING MEAL TABLET, FILM COATED ORAL
Refills: 0 | Status: ACTIVE | COMMUNITY

## 2023-06-15 RX ORDER — NORETHINDRONE ACETATE AND ETHINYL ESTRADIOL 1; 20 MG/1; UG/1
TABLET ORAL
Qty: 21 | Refills: 0 | Status: ON HOLD | COMMUNITY
Start: 2013-03-11

## 2023-06-15 RX ORDER — CALCITONIN SALMON 200 [IU]/1
_INSERT 1 SQUIRT IN ONE NOSTRIL EVERY DAY, ALTERNATING EACH NOSTRIL SPRAY, METERED NASAL
Refills: 0 | Status: ON HOLD | COMMUNITY

## 2023-06-15 RX ORDER — CYCLOBENZAPRINE HYDROCHLORIDE 10 MG/1
TABLET, FILM COATED ORAL
Qty: 90 | Refills: 0 | Status: ON HOLD | COMMUNITY
Start: 2015-12-17

## 2023-06-15 NOTE — HPI-TODAY'S VISIT:
This is a 52-year-old female with a history of pan ulcerative colitis diagnosed in 1991 and sororal history of colon cancer (age 35) presenting for long interval follow-up. She was last seen 9/23/2021.  She was in remission on 6-MP 75 mg daily.  She reported some abdominal pressure type pain on occasion which was intermittent.  Labs were ordered.  EGD was a consideration to rule out peptic ulcer disease.  Surveillance colonoscopy scheduled. She is doing well.  She is compliant with 6-mercaptopurine 50 mg 1-1/2 tablets daily.  She has 2 or 3 bowel movements per day.  Her stools are occasionally loose.  She denies urgency to defecate or red blood per rectum.  She occasionally has abdominal cramps for which she takes dicyclomine.  She is requesting a refill.  She denies other abdominal symptoms.

## 2023-06-15 NOTE — HPI-OTHER HISTORIES
Labs  4/4/2023:BMP normal.  CBC: WBC 4.4, hemoglobin 12, MCV 98, platelet 274.  Lipid panel normal. 9/19/2022:BMP normal.  LFTs normal TB 0.4, ALP 54, ALT 14, AST 21.  CBC: WBC 3.7, hemoglobin 11.4, MCV 98, platelet 270.  Lipid panel normal. Colonoscopy 11/10/2021:BBPS 9 (Suprep), entire examined colon appeared normal status post biopsy, absent in the terminal ileum status postbiopsy, sigmoid and descending diverticulosis, exam otherwise normal.  Colonoscopy for surveillance recommended in 2 years. Pathology:Terminal ileal biopsy demonstrated focal acute ileitis with erosions but no features of chronicity.  Random colon biopsies demonstrated no significant abnormality.

## 2023-06-16 ENCOUNTER — TELEPHONE ENCOUNTER (OUTPATIENT)
Dept: URBAN - METROPOLITAN AREA CLINIC 113 | Facility: CLINIC | Age: 53
End: 2023-06-16

## 2023-06-16 LAB
ALBUMIN/GLOBULIN RATIO: 2.2
ALBUMIN: 4.6
ALKALINE PHOSPHATASE: 52
ALT (SGPT): 13
AST (SGOT): 18
BILIRUBIN, DIRECT: 0.1
BILIRUBIN, INDIRECT: 0.4
BILIRUBIN, TOTAL: 0.5
GLOBULIN: 2.1
PROTEIN, TOTAL: 6.7

## 2023-08-21 ENCOUNTER — LAB OUTSIDE AN ENCOUNTER (OUTPATIENT)
Dept: URBAN - METROPOLITAN AREA CLINIC 113 | Facility: CLINIC | Age: 53
End: 2023-08-21

## 2023-09-25 ENCOUNTER — TELEPHONE ENCOUNTER (OUTPATIENT)
Dept: URBAN - METROPOLITAN AREA CLINIC 113 | Facility: CLINIC | Age: 53
End: 2023-09-25

## 2023-10-09 ENCOUNTER — TELEPHONE ENCOUNTER (OUTPATIENT)
Dept: URBAN - METROPOLITAN AREA CLINIC 113 | Facility: CLINIC | Age: 53
End: 2023-10-09

## 2023-12-12 ENCOUNTER — OFFICE VISIT (OUTPATIENT)
Dept: URBAN - METROPOLITAN AREA SURGERY CENTER 25 | Facility: SURGERY CENTER | Age: 53
End: 2023-12-12

## 2024-02-27 ENCOUNTER — COLON (OUTPATIENT)
Dept: URBAN - METROPOLITAN AREA SURGERY CENTER 25 | Facility: SURGERY CENTER | Age: 54
End: 2024-02-27

## 2024-03-12 ENCOUNTER — COLON (OUTPATIENT)
Dept: URBAN - METROPOLITAN AREA SURGERY CENTER 25 | Facility: SURGERY CENTER | Age: 54
End: 2024-03-12
Payer: COMMERCIAL

## 2024-03-12 ENCOUNTER — LAB (OUTPATIENT)
Dept: URBAN - METROPOLITAN AREA CLINIC 4 | Facility: CLINIC | Age: 54
End: 2024-03-12
Payer: COMMERCIAL

## 2024-03-12 DIAGNOSIS — K51.00 ULCERATIVE (CHRONIC) PANCOLITIS WITHOUT COMPLICATIONS: ICD-10-CM

## 2024-03-12 DIAGNOSIS — K51.80 OTHER ULCERATIVE COLITIS WITHOUT COMPLICATIONS: ICD-10-CM

## 2024-03-12 PROCEDURE — 45380 COLONOSCOPY AND BIOPSY: CPT | Performed by: INTERNAL MEDICINE

## 2024-03-12 PROCEDURE — 88305 TISSUE EXAM BY PATHOLOGIST: CPT | Performed by: PATHOLOGY

## 2024-03-12 RX ORDER — POLYETHYLENE GLYCOL 3350, SODIUM SULFATE, SODIUM CHLORIDE, POTASSIUM CHLORIDE, ASCORBIC ACID, SODIUM ASCORBATE 7.5-2.691G
KIT ORAL
Qty: 1 | Refills: 0 | Status: ON HOLD | COMMUNITY
Start: 2011-12-03

## 2024-03-12 RX ORDER — NORETHINDRONE ACETATE/ETHINYL ESTRADIOL 1.5-0.03MG
KIT ORAL
Qty: 63 | Refills: 0 | Status: ON HOLD | COMMUNITY
Start: 2016-01-28

## 2024-03-12 RX ORDER — NORETHINDRONE ACETATE AND ETHINYL ESTRADIOL 1; 20 MG/1; UG/1
TABLET ORAL
Qty: 21 | Refills: 0 | Status: ON HOLD | COMMUNITY
Start: 2013-03-11

## 2024-03-12 RX ORDER — SPIRONOLACTONE 50 MG/1
TABLET, FILM COATED ORAL
Qty: 30 | Refills: 0 | Status: ACTIVE | COMMUNITY
Start: 2019-10-07

## 2024-03-12 RX ORDER — HYDROCODONE BITARTRATE AND ACETAMINOPHEN 5; 325 MG/1; MG/1
TABLET ORAL
Qty: 15 | Refills: 0 | Status: ON HOLD | COMMUNITY
Start: 2019-05-14

## 2024-03-12 RX ORDER — ALENDRONATE SODIUM 70 MG/1
TABLET ORAL
Qty: 4 | Refills: 0 | Status: ON HOLD | COMMUNITY
Start: 2013-06-04

## 2024-03-12 RX ORDER — NEBIVOLOL HYDROCHLORIDE 5 MG/1
TABLET ORAL
Qty: 7 | Refills: 0 | Status: ON HOLD | COMMUNITY
Start: 2013-05-22

## 2024-03-12 RX ORDER — TRAMADOL HYDROCHLORIDE 50 MG/1
TK 1 TO 2 TS PO Q 4 TO 6 H PRN P TABLET ORAL
Qty: 32 | Refills: 0 | Status: ON HOLD | COMMUNITY
Start: 2017-12-29

## 2024-03-12 RX ORDER — NORETHINDRONE ACETATE AND ETHINYL ESTRADIOL 1; 20 MG/1; UG/1
TABLET ORAL
Qty: 21 | Refills: 0 | Status: ON HOLD | COMMUNITY
Start: 2011-10-06

## 2024-03-12 RX ORDER — ZOLPIDEM TARTRATE 10 MG/1
TAKE 0.5 TABLET BEDTIME PRN TABLET, FILM COATED ORAL ONCE A DAY
Status: ACTIVE | COMMUNITY

## 2024-03-12 RX ORDER — METOPROLOL SUCCINATE 50 MG/1
TABLET, EXTENDED RELEASE ORAL
Qty: 30 | Refills: 0 | Status: ON HOLD | COMMUNITY
Start: 2013-06-03

## 2024-03-12 RX ORDER — BUTALBITAL, ACETAMINOPHEN, AND CAFFEINE 50; 325; 40 MG/1; MG/1; MG/1
TABLET ORAL
Qty: 30 | Refills: 0 | Status: ON HOLD | COMMUNITY
Start: 2011-12-07

## 2024-03-12 RX ORDER — OSELTAMIVIR PHOSPHATE 75 MG/1
TAKE 1 CAPSULE BY MOUTH EVERY DAY CAPSULE ORAL
Qty: 10 | Refills: 0 | Status: ON HOLD | COMMUNITY
Start: 2018-01-12

## 2024-03-12 RX ORDER — MERCAPTOPURINE 50 MG/1
TAKE 1 AND 1/2 TABLETS BY MOUTH EVERY DAY TABLET ORAL
Qty: 150 | Refills: 3 | Status: ACTIVE | COMMUNITY

## 2024-03-12 RX ORDER — CARVEDILOL 6.25 MG/1
TAKE 1 TABLET TWICE DAILY,  WITH MORNING AND EVENING MEAL TABLET, FILM COATED ORAL
Refills: 0 | Status: ACTIVE | COMMUNITY

## 2024-03-12 RX ORDER — LOSARTAN POTASSIUM 25 MG/1
TABLET, FILM COATED ORAL
Qty: 90 | Refills: 0 | Status: ON HOLD | COMMUNITY
Start: 2019-02-25

## 2024-03-12 RX ORDER — CEPHALEXIN 500 MG/1
CAPSULE ORAL
Qty: 15 | Refills: 0 | Status: ON HOLD | COMMUNITY
Start: 2019-05-14

## 2024-03-12 RX ORDER — DAPSONE 75 MG/G
APPLY TOPICALLY A PEA SIZED AMOUNT TO FACE ONCE DAILY IN THE MORNING GEL TOPICAL
Qty: 60 | Refills: 0 | Status: ON HOLD | COMMUNITY
Start: 2019-10-10

## 2024-03-12 RX ORDER — DIAZEPAM 5 MG/1
TABLET ORAL
Qty: 1 | Refills: 0 | Status: ON HOLD | COMMUNITY
Start: 2015-11-25

## 2024-03-12 RX ORDER — CYCLOBENZAPRINE HYDROCHLORIDE 10 MG/1
TABLET, FILM COATED ORAL
Qty: 90 | Refills: 0 | Status: ON HOLD | COMMUNITY
Start: 2015-12-17

## 2024-03-12 RX ORDER — TRETIONIN 0.5 MG/G
APP A PEA SIZED AMOUNT TO WHOLE FACE NIGHTLY CREAM TOPICAL
Qty: 45 | Refills: 0 | Status: ON HOLD | COMMUNITY
Start: 2018-03-22

## 2024-03-12 RX ORDER — LORAZEPAM 0.5 MG/1
TABLET ORAL
Qty: 25 | Refills: 0 | Status: ON HOLD | COMMUNITY
Start: 2011-12-07

## 2024-03-12 RX ORDER — SIMVASTATIN 20 MG/1
TABLET, FILM COATED ORAL
Qty: 30 | Refills: 0 | Status: ACTIVE | COMMUNITY
Start: 2012-04-26

## 2024-03-12 RX ORDER — CALCITONIN SALMON 200 [IU]/1
_INSERT 1 SQUIRT IN ONE NOSTRIL EVERY DAY, ALTERNATING EACH NOSTRIL SPRAY, METERED NASAL
Refills: 0 | Status: ON HOLD | COMMUNITY

## 2024-04-26 ENCOUNTER — OV EP (OUTPATIENT)
Dept: URBAN - METROPOLITAN AREA CLINIC 113 | Facility: CLINIC | Age: 54
End: 2024-04-26

## 2024-08-11 ENCOUNTER — ERX REFILL RESPONSE (OUTPATIENT)
Dept: URBAN - METROPOLITAN AREA CLINIC 113 | Facility: CLINIC | Age: 54
End: 2024-08-11

## 2024-08-11 RX ORDER — MERCAPTOPURINE 50 MG/1
TAKE 1 & 1/2 TABLETS BY MOUTH EVERY DAY TABLET ORAL
Qty: 150 TABLET | Refills: 3 | OUTPATIENT

## 2024-08-11 RX ORDER — MERCAPTOPURINE 50 MG/1
TAKE 1 & 1/2 TABLETS BY MOUTH EVERY DAY TABLET ORAL
Qty: 150 TABLET | Refills: 4 | OUTPATIENT

## 2025-07-21 ENCOUNTER — OFFICE VISIT (OUTPATIENT)
Dept: URBAN - METROPOLITAN AREA CLINIC 113 | Facility: CLINIC | Age: 55
End: 2025-07-21
Payer: COMMERCIAL

## 2025-07-21 DIAGNOSIS — Z79.899 HIGH RISK MEDICATION USE: ICD-10-CM

## 2025-07-21 DIAGNOSIS — K51.00 ULCERATIVE PANCOLITIS WITHOUT COMPLICATION: ICD-10-CM

## 2025-07-21 DIAGNOSIS — R10.84 GENERALIZED ABDOMINAL PAIN: ICD-10-CM

## 2025-07-21 PROBLEM — 102614006: Status: ACTIVE | Noted: 2025-07-21

## 2025-07-21 PROCEDURE — 99213 OFFICE O/P EST LOW 20 MIN: CPT | Performed by: NURSE PRACTITIONER

## 2025-07-21 RX ORDER — ESTRADIOL 0.1 MG/D
1 PATCH TO SKIN PATCH TRANSDERMAL
Status: ACTIVE | COMMUNITY

## 2025-07-21 RX ORDER — NORETHINDRONE ACETATE AND ETHINYL ESTRADIOL 1; 20 MG/1; UG/1
TABLET ORAL
Qty: 21 | Refills: 0 | Status: ON HOLD | COMMUNITY
Start: 2013-03-11

## 2025-07-21 RX ORDER — CARVEDILOL 6.25 MG/1
TAKE 1 TABLET TWICE DAILY,  WITH MORNING AND EVENING MEAL TABLET, FILM COATED ORAL
Refills: 0 | Status: ACTIVE | COMMUNITY

## 2025-07-21 RX ORDER — HYDROCODONE BITARTRATE AND ACETAMINOPHEN 5; 325 MG/1; MG/1
TABLET ORAL
Qty: 15 | Refills: 0 | Status: ON HOLD | COMMUNITY
Start: 2019-05-14

## 2025-07-21 RX ORDER — NEBIVOLOL HYDROCHLORIDE 5 MG/1
TABLET ORAL
Qty: 7 | Refills: 0 | Status: ON HOLD | COMMUNITY
Start: 2013-05-22

## 2025-07-21 RX ORDER — DICYCLOMINE HYDROCHLORIDE 10 MG/1
1 TABLET CAPSULE ORAL
Qty: 60 | Refills: 3 | OUTPATIENT
Start: 2025-07-21

## 2025-07-21 RX ORDER — METOPROLOL SUCCINATE 50 MG/1
TABLET, EXTENDED RELEASE ORAL
Qty: 30 | Refills: 0 | Status: ON HOLD | COMMUNITY
Start: 2013-06-03

## 2025-07-21 RX ORDER — OSELTAMIVIR PHOSPHATE 75 MG/1
TAKE 1 CAPSULE BY MOUTH EVERY DAY CAPSULE ORAL
Qty: 10 | Refills: 0 | Status: ON HOLD | COMMUNITY
Start: 2018-01-12

## 2025-07-21 RX ORDER — CALCITONIN SALMON 200 [IU]/1
_INSERT 1 SQUIRT IN ONE NOSTRIL EVERY DAY, ALTERNATING EACH NOSTRIL SPRAY, METERED NASAL
Refills: 0 | Status: ON HOLD | COMMUNITY

## 2025-07-21 RX ORDER — CEPHALEXIN 500 MG/1
CAPSULE ORAL
Qty: 15 | Refills: 0 | Status: ON HOLD | COMMUNITY
Start: 2019-05-14

## 2025-07-21 RX ORDER — TRAMADOL HYDROCHLORIDE 50 MG/1
TK 1 TO 2 TS PO Q 4 TO 6 H PRN P TABLET, FILM COATED ORAL
Qty: 32 | Refills: 0 | Status: ON HOLD | COMMUNITY
Start: 2017-12-29

## 2025-07-21 RX ORDER — ZOLPIDEM TARTRATE 10 MG/1
TAKE 0.5 TABLET BEDTIME PRN TABLET, FILM COATED ORAL ONCE A DAY
Status: ACTIVE | COMMUNITY

## 2025-07-21 RX ORDER — CYANOCOBALAMIN (VITAMIN B-12) 1000 MCG
1 TABLET TABLET ORAL ONCE A DAY
Status: ACTIVE | COMMUNITY

## 2025-07-21 RX ORDER — MERCAPTOPURINE 50 MG/1
TAKE 1 & 1/2 TABLETS BY MOUTH EVERY DAY TABLET ORAL
Qty: 150 TABLET | Refills: 3 | Status: ACTIVE | COMMUNITY

## 2025-07-21 RX ORDER — TRETIONIN 0.5 MG/G
APP A PEA SIZED AMOUNT TO WHOLE FACE NIGHTLY CREAM TOPICAL
Qty: 45 | Refills: 0 | Status: ON HOLD | COMMUNITY
Start: 2018-03-22

## 2025-07-21 RX ORDER — LOSARTAN POTASSIUM 25 MG/1
TABLET, FILM COATED ORAL
Qty: 90 | Refills: 0 | Status: ON HOLD | COMMUNITY
Start: 2019-02-25

## 2025-07-21 RX ORDER — CYCLOBENZAPRINE HYDROCHLORIDE 10 MG/1
TABLET, FILM COATED ORAL
Qty: 90 | Refills: 0 | Status: ON HOLD | COMMUNITY
Start: 2015-12-17

## 2025-07-21 RX ORDER — SIMVASTATIN 20 MG/1
1 TABLET IN THE EVENING TABLET, FILM COATED ORAL ONCE A DAY
Refills: 0 | Status: ACTIVE | COMMUNITY
Start: 2012-04-26

## 2025-07-21 RX ORDER — POLYETHYLENE GLYCOL 3350, SODIUM SULFATE, SODIUM CHLORIDE, POTASSIUM CHLORIDE, ASCORBIC ACID, SODIUM ASCORBATE 7.5-2.691G
KIT ORAL
Qty: 1 | Refills: 0 | Status: ON HOLD | COMMUNITY
Start: 2011-12-03

## 2025-07-21 RX ORDER — NORETHINDRONE ACETATE/ETHINYL ESTRADIOL 1.5-0.03MG
KIT ORAL
Qty: 63 | Refills: 0 | Status: ON HOLD | COMMUNITY
Start: 2016-01-28

## 2025-07-21 RX ORDER — BUTALBITAL, ACETAMINOPHEN, AND CAFFEINE 50; 325; 40 MG/1; MG/1; MG/1
TABLET ORAL
Qty: 30 | Refills: 0 | Status: ON HOLD | COMMUNITY
Start: 2011-12-07

## 2025-07-21 RX ORDER — MERCAPTOPURINE 50 MG/1
TAKE 1 & 1/2 TABLETS BY MOUTH EVERY DAY TABLET ORAL DAILY
Qty: 150 | Refills: 4

## 2025-07-21 RX ORDER — NORETHINDRONE ACETATE AND ETHINYL ESTRADIOL 1; 20 MG/1; UG/1
TABLET ORAL
Qty: 21 | Refills: 0 | Status: ON HOLD | COMMUNITY
Start: 2011-10-06

## 2025-07-21 RX ORDER — DIAZEPAM 5 MG/1
TABLET ORAL
Qty: 1 | Refills: 0 | Status: ON HOLD | COMMUNITY
Start: 2015-11-25

## 2025-07-21 RX ORDER — ALENDRONATE SODIUM 70 MG/1
TABLET ORAL
Qty: 4 | Refills: 0 | Status: ON HOLD | COMMUNITY
Start: 2013-06-04

## 2025-07-21 RX ORDER — LORAZEPAM 0.5 MG/1
TABLET ORAL
Qty: 25 | Refills: 0 | Status: ON HOLD | COMMUNITY
Start: 2011-12-07

## 2025-07-21 RX ORDER — SPIRONOLACTONE 25 MG/1
1 TABLET TABLET, FILM COATED ORAL
Refills: 0 | Status: ACTIVE | COMMUNITY
Start: 2019-10-07

## 2025-07-21 RX ORDER — DAPSONE 75 MG/G
APPLY TOPICALLY A PEA SIZED AMOUNT TO FACE ONCE DAILY IN THE MORNING GEL TOPICAL
Qty: 60 | Refills: 0 | Status: ON HOLD | COMMUNITY
Start: 2019-10-10

## 2025-07-21 NOTE — HPI-OTHER HISTORIES
Colonoscopy 3/12/2024:BBPS 9 (MiraLAX), normal colon status post biopsy, normal examined terminal ileum, diverticulosis in the sigmoid and descending colon. Pathology: Random biopsy showed patchy mild chronic and active colitis, consistent with quiescent ulcerative colitis. Negative for infectious organisms, dysplasia, malignancy. Surveillance colonoscopy recommended in 2026.  Labs 2/20/2025:CBC: WBC 4, hemoglobin 11.3, MCV 97, platelet 279. Iron studies and B12 normal.

## 2025-07-21 NOTE — HPI-TODAY'S VISIT:
This is a 54-year-old female with a history of pan ulcerative colitis diagnosed in 1991 on mercaptopurine and a sororal history of colon cancer presenting for follow-up. She was last seen 6/15/2023.  She was in remission on 6-mercaptopurine.  She was to continue her current regimen.  She had occasional abdominal pain relieved with dicyclomine which she was to continue.  Recent labs were unremarkable but did not include hepatic function.  This was ordered.  She was scheduled for surveillance colonoscopy.  Procedures were canceled and rescheduled.  Colonoscopy 3/12/2024:BBPS 9 (MiraLAX), normal colon status post biopsy, normal examined terminal ileum, diverticulosis in the sigmoid and descending colon.  Pathology: Random biopsy showed patchy mild chronic and active colitis, consistent with quiescent ulcerative colitis.  Negative for infectious organisms, dysplasia, malignancy.  Surveillance colonoscopy recommended in 2026.  She occasionally requires dicyclomine for crampy abdominal pain.  She has loose bowel movements x 3 daily.  This is her baseline.  She denies red blood per rectum, urgency to defecate, or any other abdominal symptoms.  November labs reviewed on her phone record.